# Patient Record
Sex: MALE | Race: OTHER
[De-identification: names, ages, dates, MRNs, and addresses within clinical notes are randomized per-mention and may not be internally consistent; named-entity substitution may affect disease eponyms.]

---

## 2020-12-24 ENCOUNTER — HOSPITAL ENCOUNTER (EMERGENCY)
Dept: HOSPITAL 62 - ER | Age: 30
Discharge: HOME | End: 2020-12-24
Payer: COMMERCIAL

## 2020-12-24 VITALS — DIASTOLIC BLOOD PRESSURE: 82 MMHG | SYSTOLIC BLOOD PRESSURE: 143 MMHG
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
## 2020-12-24 DIAGNOSIS — S61.206A: ICD-10-CM

## 2020-12-24 DIAGNOSIS — S61.204A: Primary | ICD-10-CM
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
## 2020-12-24 PROCEDURE — 99283 EMERGENCY DEPT VISIT LOW MDM: CPT

## 2020-12-24 NOTE — ER DOCUMENT REPORT
HPI





- HPI


Time Seen by Provider: 12/24/20 11:51


Notes: 





30-year-old male presents to the emergency room today with complaints of right 

4th and 5th finger avulsion after using mandolin last night around 9:30 PM while

he was making dinner.  Bleeding is controlled.  Tetanus is up-to-date, was vacc

inated last year.  he came to get checked out since his wife insisted that he 

get evaluated.  Denies any numbness or tingling to bilateral upper extremities. 

Denies any other area of injury.  Denies fevers, chills,  chest 

pain,palpitations,  shortness of breath, dyspnea, nausea, vomiting, diarrhea, 

abdominal pain, hematuria,blurred vision, double vision, loss of vision, speech 

changes, LH, dizziness, syncope, weakness, bowel or bladder dysfunction, saddle 

anesthesia, numbness or tingling in bilateral upper or lower extremities 

equally, muscle paralysis, weakness in bilateral upper or lower extremities 

equally or rash. 





Past Medical History





- General


Information source: Patient





- Social History


Smoking Status: Unknown if Ever Smoked


Family History: Reviewed & Not Pertinent





Vertical Provider Document





- CONSTITUTIONAL


Agree With Documented VS: Yes


Exam Limitations: No Limitations


General Appearance: WD/WN


Notes: 








MEDICATIONS: I agree with the patient medications as charted by the RN.





ALLERGIES: I agree with the allergies as charted by the RN.





PAST MEDICAL HISTORY/PAST SURGICAL HISTORY: Reviewed and agree as charted by RN.





SOCIAL HISTORY: Reviewed and agree as charted by RN.





FAMILY HISTORY: No significant familial comorbid conditions directly related to 

patient complaint





EXAM:


Reviewed vital signs as charted by RN.





PHYSICAL EXAMINATION:reviewed vital signs by RN





GENERAL: Well-appearing, well-nourished and in no acute distress.





HEAD: Atraumatic, normocephalic.





EYES: Pupils equal round and reactive to light, extraocular movements intact, 

sclera anicteric, conjunctiva are normal.





ENT: Nares patent, oropharynx clear without exudates.  Moist mucous membranes.





NECK: Normal range of motion, supple without lymphadenopathy





LUNGS: Breath sounds clear to auscultation bilaterally and equal.  No wheezes 

rales or rhonchi.





HEART: Regular rate and rhythm without murmurs





ABDOMEN: Soft, nontender, nondistended abdomen.  No guarding, no rebound.  No 

masses appreciated.





Musculoskeletal: Normal range of motion, no pitting or edema.  No cyanosis.





NEUROLOGICAL: Cranial nerves grossly intact.  Normal speech, normal gait.  

Normal sensory, motor exams 





PSYCH: Normal mood, normal affect.





SKIN: Warm, Dry, normal turgor, no rashes or lesions noted.  Fourth and fifth 

distal aspect of phalanx with superficial skin avulsion to tip approximately 0.3

cm with both skin avulsions. cap refill <3 seconds.  + 2 BUE equally. 

radial pulses + 2 BUE equally. Negative kanavels sign. No open wounds or drai

nage from wrist. No  vascular compromise.No body crepitus or focal area of TTP. 

Motor and sensory function of ulnar, radial, medial nerves intact bilaterally 

and equally. strength 5/5 in BUE equally.





Course





- Re-evaluation


Re-evalutation: 





12/24/20 12:00


Afebrile vital stable no distress.  Nurses notes reviewed.  Discussed with jose zendejas that he does have a skin avulsion, there is no need for any suturing.  His 

tetanus is up-to-date, received it last year.  Discussed with patient that his 

wound is well over 12 hours old, is considered contaminated wound, will place 

him on prophylactic antibiotics, Keflex twice a day for 5 days.  Advised to wash

with soap and water twice a day, monitor for any signs of infection such as 

redness, swelling, drainage.  To follow-up with his primary care provider within

the next 24 to 48 hours as needed.  No signs of infection.  Normal motor and 

sensory function to affected fingers.  After performing a Medical Screening 

Examination, I estimate there is LOW risk for OPEN FRACTURE, COMPARTMENT 

SYNDROME, TENDON RUPTURE, ACUTE NEUROVASCULAR INJURY, or RETAINED FOREIGN BODY, 

thus I consider the discharge disposition reasonable. Also, there is no evidence

or peritonitis, sepsis, or toxicity.  I have reevaluated this patient multiple 

times and no significant life threatening changes are noted. The patient and I 

have discussed the diagnosis and risks, and we agree with discharging home with 

close follow-up with the understanding that symptoms and presentations can 

change. We also discussed returning to the Emergency Department immediately if 

new or worsening symptoms occur. We have discussed the symptoms which are most 

concerning (e.g., changing or worsening pain, fever, numbness, weakness, cool or

painful digits) that necessitate immediate return.





- Vital Signs


Vital signs: 


                                        











Temp Pulse Resp BP Pulse Ox


 


 98.4 F   55 L  16   143/82 H  98 


 


 12/24/20 10:41  12/24/20 10:41  12/24/20 10:41  12/24/20 10:41  12/24/20 10:41














- Laboratory Results


Critical Laboratory Results Reviewed: No Critical Results





- Radiology Results


Critical Radiology Results Reviewed: No Critical Results





Discharge





- Discharge


Clinical Impression: 


 Avulsion, skin





Condition: Stable


Disposition: HOME, SELF-CARE


Additional Instructions: 


You have a skin avulsion of your fingers.  He do not need any x-rays.  Your 

tetanus is up-to-date since he got it last year.  Please wash with soap and 

water twice a day.  Monitor for any signs of infection such as redness, 

swelling, drainage.  You are placed on prophylactic antibiotics, Keflex, twice a

day, please take this with food.  Follow-up with your primary care provider as 

needed.  Return immediately for any new or worsening symptoms.





Follow up with primary care provider, call tomorrow to make followup 

appointment.


Prescriptions: 


Cephalexin Monohydrate [Keflex 500 mg Capsule] 500 mg PO BID #10 capsule


Referrals: 


TAE DUBOIS MD [[facility]] - Follow up as needed